# Patient Record
Sex: FEMALE | Race: WHITE
[De-identification: names, ages, dates, MRNs, and addresses within clinical notes are randomized per-mention and may not be internally consistent; named-entity substitution may affect disease eponyms.]

---

## 2017-01-14 ENCOUNTER — HOSPITAL ENCOUNTER (INPATIENT)
Dept: HOSPITAL 10 - OBT | Age: 27
LOS: 3 days | Discharge: HOME | End: 2017-01-17
Attending: OBSTETRICS & GYNECOLOGY | Admitting: OBSTETRICS & GYNECOLOGY
Payer: MEDICAID

## 2017-01-14 VITALS
BODY MASS INDEX: 29.07 KG/M2 | HEIGHT: 59 IN | WEIGHT: 144.18 LBS | WEIGHT: 144.18 LBS | BODY MASS INDEX: 29.07 KG/M2 | HEIGHT: 59 IN

## 2017-01-14 VITALS — DIASTOLIC BLOOD PRESSURE: 70 MMHG | HEART RATE: 61 BPM | RESPIRATION RATE: 18 BRPM | SYSTOLIC BLOOD PRESSURE: 127 MMHG

## 2017-01-14 DIAGNOSIS — Z3A.38: ICD-10-CM

## 2017-01-14 DIAGNOSIS — Z23: ICD-10-CM

## 2017-01-14 DIAGNOSIS — O34.211: ICD-10-CM

## 2017-01-14 LAB
ADD UMIC: YES
APTT BLD: 29.6 SEC (ref 25–35)
BACTERIA #/AREA URNS HPF: (no result) /[HPF]
BASOPHILS # BLD AUTO: 0 10^3/UL (ref 0–0.1)
BASOPHILS NFR BLD: 0.2 % (ref 0–2)
COLOR UR: (no result)
CONDITION: 1
EOSINOPHIL # BLD: 0.1 10^3/UL (ref 0–0.5)
EOSINOPHIL NFR BLD: 0.4 % (ref 0–7)
ERYTHROCYTE [DISTWIDTH] IN BLOOD BY AUTOMATED COUNT: 13.2 % (ref 11.5–14.5)
GLUCOSE UR STRIP-MCNC: NEGATIVE %
HCT VFR BLD CALC: 37.5 % (ref 37–47)
HGB BLD-MCNC: 12.9 G/DL (ref 12–16)
INR PPP: 0.88
KETONES UR STRIP.AUTO-MCNC: 15 MG/DL
LYMPHOCYTES # BLD AUTO: 3.1 10^3/UL (ref 0.8–2.9)
LYMPHOCYTES NFR BLD AUTO: 22.8 % (ref 15–51)
MCH RBC QN AUTO: 30.1 PG (ref 29–33)
MCHC RBC AUTO-ENTMCNC: 34.3 G/DL (ref 32–37)
MCV RBC AUTO: 87.8 FL (ref 82–101)
MONOCYTES # BLD: 0.6 10^3/UL (ref 0.3–0.9)
MONOCYTES NFR BLD: 4.4 % (ref 0–11)
NEUTROPHILS # BLD: 9.9 10^3/UL (ref 1.6–7.5)
NEUTROPHILS NFR BLD AUTO: 72.2 % (ref 39–77)
NITRITE UR QL STRIP.AUTO: NEGATIVE
NRBC # BLD MANUAL: 0 10^3/UL (ref 0–0)
NRBC BLD QL: 0 /100WBC (ref 0–0)
PLATELET # BLD: 235 10^3/UL (ref 140–440)
PMV BLD AUTO: 9 FL (ref 7.4–10.4)
PROTHROMBIN TIME: 11.9 SEC (ref 12.2–14.2)
PT RATIO: 0.9
RBC # BLD AUTO: 4.27 10^6/UL (ref 4.2–5.4)
RBC # UR AUTO: (no result) /UL
RBC #/AREA URNS HPF: (no result) /HPF
SQUAMOUS #/AREA URNS HPF: (no result) /[HPF]
URINE BILIRUBIN (DIP): NEGATIVE
URINE TOTAL PROTEIN (DIP): NEGATIVE
UROBILINOGEN UR STRIP-ACNC: (no result) (ref 0.1–1)
WBC # BLD AUTO: 13.7 10^3/UL (ref 4.8–10.8)
WBC # BLD: 13.7 10^3/UL (ref 4.8–10.8)
WBC # UR STRIP: (no result) /UL

## 2017-01-14 PROCEDURE — 86900 BLOOD TYPING SEROLOGIC ABO: CPT

## 2017-01-14 PROCEDURE — 62319: CPT

## 2017-01-14 PROCEDURE — 86850 RBC ANTIBODY SCREEN: CPT

## 2017-01-14 PROCEDURE — 81001 URINALYSIS AUTO W/SCOPE: CPT

## 2017-01-14 PROCEDURE — 86920 COMPATIBILITY TEST SPIN: CPT

## 2017-01-14 PROCEDURE — 86901 BLOOD TYPING SEROLOGIC RH(D): CPT

## 2017-01-14 PROCEDURE — 90686 IIV4 VACC NO PRSV 0.5 ML IM: CPT

## 2017-01-14 PROCEDURE — 90715 TDAP VACCINE 7 YRS/> IM: CPT

## 2017-01-14 PROCEDURE — 85610 PROTHROMBIN TIME: CPT

## 2017-01-14 PROCEDURE — 86592 SYPHILIS TEST NON-TREP QUAL: CPT

## 2017-01-14 PROCEDURE — 85730 THROMBOPLASTIN TIME PARTIAL: CPT

## 2017-01-14 PROCEDURE — 81003 URINALYSIS AUTO W/O SCOPE: CPT

## 2017-01-14 PROCEDURE — 36415 COLL VENOUS BLD VENIPUNCTURE: CPT

## 2017-01-14 PROCEDURE — G0463 HOSPITAL OUTPT CLINIC VISIT: HCPCS

## 2017-01-14 PROCEDURE — 84112 EVAL AMNIOTIC FLUID PROTEIN: CPT

## 2017-01-14 PROCEDURE — 85025 COMPLETE CBC W/AUTO DIFF WBC: CPT

## 2017-01-14 RX ADMIN — PYRIDOXINE HYDROCHLORIDE SCH MLS/HR: 100 INJECTION, SOLUTION INTRAMUSCULAR; INTRAVENOUS at 23:36

## 2017-01-14 NOTE — HP
Date/Time of Note


Date/Time of Note


DATE: 17 


TIME: 23:21





OB - History


Hx of Present Pregnancy


Free Text/Dictation


27yo  at 38+5 by 12wk U/S with hx of 1LTCS x1 presented to OB triage with c/

o progressively worsening contraction pain since 0700 and LOF since 1400. Pt 

reports normal FM and denies VB.





Prior C/S was performed in  2/2 fetal distress per pt after a 24hr 

induction with little cervical change. 





OB course has been uncomplicated.


Pt has hx of R breast lumpectomy in  with benign pathology. She has been 

followed q6 months at [facility] for current R breast mass, unchanged 

x4 years.


Estimated Due Date:  2017


:  2


Para:  1


Prenatal Care:  Good Care


Ultrasounds:  Normal mid trimester US


Obstetrical Complications:  None


Medical Complications:  Other (R breast mass)





Past Family/Social History


*


Past Medical, Surgical, Family and Obstetric Histories reviewed from prenatal 

chart.


Blood Type:  O+


Rubella:  immune


RPR/VDRL:  Negative


GBS Status:  Negative


HBsAG:  Negative





OB  Admission Exam


Vital Signs


Vital Signs





 Vital Signs








  Date Time  Temp Pulse Resp B/P Pulse Ox O2 Delivery O2 Flow Rate FiO2


 


17 22:24 98.5 61 18 127/70  Room Air  











Physical Exam


HEENT:  WNL


Heart:  Other (R Breast- 2x2cm mobile mass palpated at 1-2 o'clock position)


Lungs:  Clear


Abdomen:  WNL


Extremities:  Normal


Cervical Dilatation:  1cm


Effacement:  Other (90%)


Station:  -2


Membranes:  Ruptured


Amniotic Fluid:  Clear


Fetal Heart Rate:  120's (110s primarily)


Accelerations:  Accelerations Present


Decelerations:  No Decelerations


Varibility:  Moderate


Contractions on Admission:  < 5 Minutes Apart


Intensity:  Firm


Last 72 hours Lab Results


 CBC & BMP


17 22:54











OB  Assessment/Plan


Other Assessment:


1) Early Labor at early term


2) Hx of C/S x1


Other plan:


1) Labor:


Given pt has had a prior C/S, discussed with pt options for mode of delivery, 

including repeat  section versus TOLAC. The risks of TOLAC after a C/S 

include an approximately <1% risk of uterine rupture which may result in fetal 

distress and emergent  delivery, increasing the risk of maternal and 

 morbidity. Pt is also aware of the risk of failure and need for a 

repeat  section. The expected recovery times for both modes of 

deliveries were discussed. Pt was made aware that if she chooses TOLAC, she may 

change her mind and request to proceed with a C/S at any time, if she has not 

yet delivered. The risks of C/S included but are not limited to pain, infection

, damage to nearby organs, structures or injury to baby, bleeding possibly 

requiring transfusion of blood products and abnormal placentation in future 

pregnancies. She had an opportunity to ask questions and to discuss the options 

with her  prior to making a decision. Pt desires to proceed with TOLAC. 

Consent forms for TOLAC, Repeat C/S and transfusion of blood products are in 

the chart. CEFM and toco.





2) ID: 


Pt s/p ROM x9 hours. Afebrile. GBS negative. Antibiotics are not indicated.





3) Pain:


Pt desires epidural. Anesthesiologist will be consulted once admission labs are 

resulted.





4) FWB: 


Category I FHT. Reassuring





5) R Breast Mass:


Asymptomatic. Continue expectant management. Pt to f/up at [facility] 

in 2mths for routine visit.





Expectations for admission to L&D, for labor and postpartum course if she 

proceeds with TOLAC were explained, as were the same for repeat C/S. Pt and 

partner expressed understanding of discussion which was conducted in a language 

in which they understand. Questions were answered to their satisfaction.











FLAQUITA FARFAN MD 2017 23:36

## 2017-01-15 VITALS — RESPIRATION RATE: 18 BRPM | DIASTOLIC BLOOD PRESSURE: 55 MMHG | HEART RATE: 80 BPM | SYSTOLIC BLOOD PRESSURE: 105 MMHG

## 2017-01-15 VITALS — HEART RATE: 78 BPM | DIASTOLIC BLOOD PRESSURE: 61 MMHG | RESPIRATION RATE: 18 BRPM | SYSTOLIC BLOOD PRESSURE: 103 MMHG

## 2017-01-15 VITALS — SYSTOLIC BLOOD PRESSURE: 114 MMHG | DIASTOLIC BLOOD PRESSURE: 59 MMHG | RESPIRATION RATE: 16 BRPM | HEART RATE: 77 BPM

## 2017-01-15 VITALS — SYSTOLIC BLOOD PRESSURE: 104 MMHG | HEART RATE: 75 BPM | DIASTOLIC BLOOD PRESSURE: 53 MMHG | RESPIRATION RATE: 19 BRPM

## 2017-01-15 PROCEDURE — 0DQR0ZZ REPAIR ANAL SPHINCTER, OPEN APPROACH: ICD-10-PCS | Performed by: OBSTETRICS & GYNECOLOGY

## 2017-01-15 RX ADMIN — DOCUSATE SODIUM AND SENNOSIDES SCH TAB: 8.6; 5 TABLET, FILM COATED ORAL at 09:00

## 2017-01-15 RX ADMIN — ACETAMINOPHEN AND CODEINE PHOSPHATE PRN TAB: 30; 300 TABLET ORAL at 12:43

## 2017-01-15 RX ADMIN — ACETAMINOPHEN AND CODEINE PHOSPHATE PRN TAB: 30; 300 TABLET ORAL at 21:57

## 2017-01-15 RX ADMIN — PYRIDOXINE HYDROCHLORIDE SCH MLS/HR: 100 INJECTION, SOLUTION INTRAMUSCULAR; INTRAVENOUS at 00:28

## 2017-01-15 RX ADMIN — IBUPROFEN SCH MG: 600 TABLET ORAL at 17:38

## 2017-01-15 RX ADMIN — PYRIDOXINE HYDROCHLORIDE SCH MLS/HR: 100 INJECTION, SOLUTION INTRAMUSCULAR; INTRAVENOUS at 23:27

## 2017-01-15 RX ADMIN — IBUPROFEN SCH MG: 600 TABLET ORAL at 11:36

## 2017-01-15 RX ADMIN — PYRIDOXINE HYDROCHLORIDE SCH MLS/HR: 100 INJECTION, SOLUTION INTRAMUSCULAR; INTRAVENOUS at 10:55

## 2017-01-15 RX ADMIN — PYRIDOXINE HYDROCHLORIDE SCH MLS/HR: 100 INJECTION, SOLUTION INTRAMUSCULAR; INTRAVENOUS at 07:27

## 2017-01-15 RX ADMIN — DOCUSATE SODIUM AND SENNOSIDES SCH TAB: 8.6; 5 TABLET, FILM COATED ORAL at 21:09

## 2017-01-15 NOTE — LDN
Date/Time of Note


Date/Time of Note


DATE: 1/15/17 


TIME: 07:27





Delivery Summary


Pt progressed to c/c/-1 and labored down to 0 station. She then pushed with 

good effort to +2, however deep variables were noted to the 60s with pushing. 

The FHT returned to baseline following pushing, however at times had slow 

return. Variability largely moderate. Patient then began  pushing with 

alternating contractions, however decels remained. Using a  

Elvi,  #[id number], the R/B/A of VAVD (as a double setup in the OR) 

versus RLTCS were discussed in detail including risks of vacuum which include 

but are not limited to cephalohematoma, subgaleal hemorrhage, retinal hemorrhage

, shoulder dystocia, scalp lacerations and failure. The patient initially 

desired a RLTCS after being counseled on risks of operative delivery and pt was 

taken to the OR. En route, however, pt changed her mind and expressed a desire 

for a trial of vacuum delivery.





Pt pushed under epidural anesthesia in the OR under a double setup to a  

via vaccuum assist of a liveborn male infant weighing 2950g with Apgars of 9 

and 9 at 1 and 5 min respectively. Over three contractions with no pull-offs, 

the head delivered from JORGE position and the vacuum was removed. The R anterior 

shoulder followed by the posterior shoulder and the remainder of the body was 

delivered through a nuchal cord x1. The infant was vigorous at birth and placed 

on mother's abdomen. The pt received standard IV Pitocin. The cord was doubly 

clamped and cut and the baby was taken to the warmer for evaluation by RT and 

NICU nurses given the operative delivery. Cord blood was collected. An intact 

3VC placenta spontaneously delivered shortly thereafter. The fundus was noted 

to be firm. Inspection of the vagina and perineum revealed a 3rd degree (3b) 

perineal lacerations and a hemostatic L labial laceration which was not 

repaired. The 3rd degree laceration was repaired with a combination of regional 

and local anesthesia using 0 Vicryl suture. Using end-to-end method, 

interrupted sutures were placed to bring the ends together. The second degree 

was repaired in the usual fashion using 3-0 Vicryl. The patient received Ancef 

2g IV x1. Hemostasis was appropriate following repair. Several rectal 

examinations were done which confirmed no defect or palpable suture. Infant and 

mother recovering in PACU. EBL 400ml


Assisted Vaginal Delivery:  Vacuum


Placenta Delivered:  Spontaneously


Meconium:  none


Perineum intact?:  No


Perineal laceration:  3


Anesthesia type:  Epidural


Estimated blood loss:  400


Sponge & Needle done & correct:  Yes


All needle counts correct:  Yes


Any foreign bodies felt in the:  No


Problems:  





Infant Delivery Information


Sex


Infant Sex:  male





Apgars


1 Minute:  9


5 Minute:  9





Suctioning


Nose & mouth suctioned at christian:  No


Delee suction performed:  No





Umbilical Cord


Umbilical cord with:  3 Vessels


Cord presentations:  nuchal cord


Nuchal cord present X:  1





Mother & Baby Disposition


Disposition


Mom & Baby to Maternity; Good:  Yes


Baby to NICU:  No











FLAQUITA FARFAN MD Doron 15, 2017 07:27

## 2017-01-15 NOTE — OPRPT
===================================

Intraop Record

===================================

Datetime Report Generated by CPN: 01/15/2017 08:54

   

   

===========================

Datetime: 01/14/2017 21:48

===========================

   

Food Allergies/Reactions:  NO

Latex Allergies/Reactions:  No Latex Allergies

   

===========================

Datetime: 01/14/2017 21:38

===========================

   

Drug Allergies/Reactions:  No Known Allergy (01/14/2017)

   

===========================

Datetime: 01/14/2017 21:32

===========================

   

Drug Allergies/Reactions:  Unknown: Unable to obtain/MI (08/12/2010)

## 2017-01-15 NOTE — TRIAGE
===================================

OB Triage

===================================

Datetime Report Generated by CPN: 01/15/2017 00:03

   

   

===========================

Datetime: 01/14/2017 23:27

===========================

   

 Stage of Pregnancy:  Labor

   

===========================

Datetime: 01/14/2017 23:15

===========================

   

 Stage of Pregnancy:  OB Triage

   

===================================

Labor Evaluation

===================================

   

 Frequency:  2-5

 Monitor Mode:  External

 Duration (sec)2399:  

 Quality:  Moderate

 Pattern:  Normal: <= 5 Contractions in 10 Minutes

 Resting Tone [place]:  Relaxed

   

===================================

Fetal Heart Rate

===================================

   

 FHR Baseline Rate:  120

 Monitor Mode:  External US

 Variability:  Moderate 6-25 bpm

 Accelerations:  15X15

 Decelerations:  None

 Category:  Category I

   

===================================

Pain Assessment

===================================

   

 Pain Scale:  8

 Pain Presence:  Intermittent

 Pain Type:  Cramping; Contraction; Ache

 Pain Location:  Abdomen; Back

 Pain Goal:  5

 Pain Relief Measures:  Comfort Measures

 Pain Assessment Comments:  WANTS EPIDURAL

   

===========================

Datetime: 01/14/2017 22:15

===========================

   

 Stage of Pregnancy:  OB Triage

 Temperature Route:  Oral

   

===================================

Labor Evaluation

===================================

   

 Frequency:  2-3.5

 Monitor Mode:  External

 Duration (sec)2399:  

 Quality:  Moderate

 Pattern:  Normal: <= 5 Contractions in 10 Minutes

 Resting Tone [place]:  Relaxed

   

===================================

Fetal Heart Rate

===================================

   

 FHR Baseline Rate:  125

 Monitor Mode:  External US

 Variability:  Moderate 6-25 bpm

 Accelerations:  15X15

 Decelerations:  None

 Category:  Category I

   

===================================

Pain Assessment

===================================

   

 Pain Scale:  8 (Annotations: WHEN CXS COMES)

 Pain Presence:  Intermittent

 Pain Type:  Cramping; Contraction; Ache

 Pain Location:  Abdomen; Back

 Pain Goal:  5

 Pain Relief Measures:  Comfort Measures

   

===========================

Datetime: 01/14/2017 22:06

===========================

   

   

===================================

Vaginal Exam

===================================

   

 Dilatation (cms):  1.0

 Effacement (%):  90

 Station:  -2

 Exam By:  NABOR

 Vaginal Bleeding:  None

 Cervix, Consistency:  Soft

 Cervix, Position:  Anterior

   

===========================

Datetime: 01/14/2017 21:51

===========================

   

   

===================================

Maternal Assessment

===================================

   

 Level of Consciousness:  Fully Conscious

 DTR's/Clonus:  DTRs 2+; No Clonus

 Headache:  Denies

 Blurred Vision:  No

 Respiratory Effort:  Unlabored; Regular Rhythm; Equal Expansion

 Breath Sounds, Left:  Clear and Equal

 Breath Sounds, Right:  Clear and Equal

 Nausea/Vomiting:  Denies

 RUQ Epigastric Pain:  Denies

 Lower Extremities Edema:  None

 Upper Extremities Edema:  None

 Facial Edema:  None

   

===================================

Fall Risk Assessment

===================================

   

 History of Falling:  (0) No

 Secondary Diagnosis:  (0) No

 Ambulatory Aid:  (0) Bedrest/Nurse Assist

 IV Therapy:  (0) No

 Gait:  (0) Normal/Bedrest/Immobile

 Mental Status:  (0) Oriented to Own Ability

 Fall Score:  0

 Fall Risk Score Definition:  No Risk: No action required

   

===========================

Datetime: 01/14/2017 21:49

===========================

   

 Time of Arrival:  01/14/2017 21:35

 EGA:  38.5

 Arrived By:  Wheelchair

 Arrived From:  Home

 Chief Complaint:  CXS SINCE 0700 AND LEAKING SINCE 1400

 Fetal Movement:  Present

 Contractions:  Regular

 Time Contractions Began:  01/14/2017 07:00

 Contractions:  Q10

 Rupture of Membranes:  Unsure

 Vaginal Bleeding:  None

 Vaginal Discharge:  Denies

 Recent Sexual Intercouse:  Denies

 Abdominal Trauma:  Not Applicable

 Patient Complaints:  Contractions

 Time Provider Notified:  01/14/2017 21:57

 Provider Notified:  NAHEED

 Initial Plan:  EFM, ASSESSMENT, CALL MD FOR ORDERS

   

===========================

Datetime: 01/14/2017 21:48

===========================

   

 Membranes Ruptured Date/Time:  01/14/2017 14:00

 Membranes Rupture Method:  Spontaneous

## 2017-01-15 NOTE — DS
Date/Time of Note


Date/Time of Note


DATE: 1/15/17 


TIME: 12:17





Obstetrical Discharge Record


Final Diagnosis


Final Diagnosis:  Term delivered





Vaginal Delivery


Obstetrical Delivery:  Spontaneous





Condition on Discharge


Physical Assessment


Last Vitals:


Vital sign  stable abdomen soft uterus firm lochia normal extremity normal 

discharged home with follow-up instruction to make appointment in 2 weeks post 

delivery check


 Laboratory Tests








Test


  1/14/17


21:45 1/14/17


22:00 1/14/17


22:54


 


Urine Bacteria FEW   


 


Urine Bilirubin NEGATIVE   


 


Urine Clarity CLEAR   


 


Urine Color LT. YELLOW   


 


Urine Glucose NEGATIVE%   


 


Urine Hemoglobin 2+   


 


Urine Ketones 15   


 


Urine Leukocyte Esterase TRACE   


 


Urine Microscopic RBC 2-5/HPF   


 


Urine Microscopic WBC 5-10/HPF   


 


Urine Nitrite NEGATIVE   


 


Urine Specific Gravity 1.010   


 


Urine Squamous Epithelial


Cells MANY 


  


  


 


 


Urine Total Protein NEGATIVE   


 


Urine Urobilinogen 0.2  E.U./dL   


 


Urine pH 6.5   


 


Fetal Membranes Rupture  POSITIVE  


 


Activated Partial


Thromboplast Time 


  


  29.6Sec 


 


 


Basophils #   0.010^3/ul 


 


Basophils %   0.2% 


 


Eosinophils #   0.110^3/ul 


 


Eosinophils %   0.4% 


 


Hematocrit   37.5% 


 


Hemoglobin   12.9g/dl 


 


INR International Normalized


Ratio 


  


  0.88 


 


 


Lymphocytes #   3.110^3/ul 


 


Lymphocytes %   22.8% 


 


Mean Corpuscular Hemoglobin   30.1pg 


 


Mean Corpuscular Hemoglobin


Concent 


  


  34.3g/dl 


 


 


Mean Corpuscular Volume   87.8fl 


 


Mean Platelet Volume   9.0fl 


 


Monocytes #   0.610^3/ul 


 


Monocytes %   4.4% 


 


Neutrophils #   9.910^3/ul 


 


Neutrophils %   72.2% 


 


Nucleated Red Blood Cells #   0.010^3/ul 


 


Nucleated Red Blood Cells %   0.0/100WBC 


 


Platelet Count   41180^3/UL 


 


Prothrombin Time   11.9Sec 


 


Prothrombin Time Ratio   0.9 


 


Red Blood Count   4.2710^6/ul 


 


Red Cell Distribution Width   13.2% 


 


White Blood Count   13.710^3/ul 








 Current Medications








 Medications


  (Trade)  Dose


 Ordered  Sig/Desiree


 Route


 PRN Reason  Start Time


 Stop Time Status Last Admin


Dose Admin


 


 Terbutaline


 Sulfate 0.25 mg  0.25 mg  ONCE  ONCE


 SC


   1/14/17 22:30


 1/14/17 22:31 DC  


 


 


 Terbutaline


 Sulfate  0 ml @   STK-MED ONCE


 .ROUTE


   1/14/17 22:12


 1/14/17 22:13 DC  


 


 


 Lactated Ringer's  1,000 ml @ 


 125 mls/hr  Q8H


 IV


   1/14/17 22:46


 1/15/17 07:30 DC 1/15/17 00:28


 


 


 Oxytocin/Lactated


 Ringer's  500 ml @ 0


 mls/hr  ONCE PRN


 IV


 For Hemorrhage Management  1/14/17 23:00


 1/15/17 07:30 DC  


 


 


 Methylergonovine


 Maleate


  (Methergine)  0.2 mg  ONCE PRN


 IM


 VAGINAL BLEEDING  1/14/17 23:00


 1/15/17 07:30 DC  


 


 


 Carboprost


 Tromethamine


  (Hemabate)  250 mcg  ONCE PRN


 IM


 VAGINAL BLEEDING  1/14/17 23:00


 1/15/17 07:30 DC  


 


 


 Misoprostol


  (Cytotec)  1,000 mcg  ONCE PRN


 GA


 VAGINAL BLEEDING  1/14/17 23:00


 1/15/17 07:30 DC  


 


 


 Butorphanol


 Tartrate


  (Stadol)  2 mg  Q2H  PRN


 IV


 PAIN  1/14/17 23:00


 1/15/17 07:30 DC  


 


 


 Lidocaine 30 ml  30 ml  ONCE PRN


 INJ


 EPISIOTOMY/TEARING  1/14/17 23:00


 1/15/17 07:30 DC  


 


 


 Oxytocin/Lactated


 Ringer's  500 ml @ 


 125 mls/hr  ONCE -MAY REPEAT X1


 IV


   1/14/17 23:00


 1/15/17 07:30 DC 1/15/17 07:08


 


 


 Oxytocin/Lactated


 Ringer's  500 ml @ 


 125 mls/hr  ONCE


 IV


   1/14/17 23:00


 1/15/17 07:30 DC  


 


 


 Ibuprofen


  (Motrin)  600 mg  ONCE PRN


 PO


 Mild Pain (Pain Score 1-3)  1/14/17 23:00


 1/15/17 07:30 DC  


 


 


 Acetaminophen/


 Codeine Phosphate


 2 tab  2 tab  ONCE PRN


 PO


 Moderate to Severe Pain (4-10)  1/14/17 23:00


 1/15/17 07:30 DC  


 


 


 Lactated Ringer's  1,000 ml @ 


 2,000 mls/hr  Q30M PRN


 IV


 PRE-EPIDURAL BOLUS  1/14/17 23:00


 1/15/17 07:30 DC 1/14/17 23:28


 


 


 Fentanyl/


 Ropivacaine  100 ml @   STK-MED ONCE


 .ROUTE


   1/14/17 23:52


 1/14/17 23:53 DC  


 


 


 Acetaminophen


  (Tylenol Tab)  1,000 mg  ONCE  STAT


 PO


   1/15/17 00:42


 1/15/17 00:45 DC 1/15/17 01:31


 


 


 Naloxone HCl


  (Narcan)  0.2 mg  Q2M  PRN


 IV


 FOR RESP RATE 8 OR LESS  1/15/17 04:30


     


 


 


 Fentanyl/


 Ropivacaine  100 ml  EPIDURAL (PCEA)


 EPI


   1/15/17 04:30


     


 


 


 Terbutaline


 Sulfate


  (Brethine)  0.25 mg  NOW  STAT


 SC


   1/15/17 04:52


 1/15/17 05:00 DC  


 


 


 Cefazolin Sodium


  (Ancef)  1 gm  STK-MED ONCE


 .ROUTE


   1/15/17 06:17


 1/15/17 06:18 DC  


 


 


 Lidocaine/


 Epinephrine


  (Xylocaine 2%/


 Epi)  30 ml  STK-MED ONCE


 .ROUTE


   1/15/17 06:19


 1/15/17 06:20 DC  


 


 


 Fentanyl


  (Sublimaze)  100 mcg  STK-MED ONCE


 .ROUTE


   1/15/17 06:24


 1/15/17 06:25 DC  


 


 


 Hydromorphone HCl


  (Dilaudid (Rec))  0.2 mg  PACU ORDER PRN


 IV


 MILD PAIN LEVEL 1-3  1/15/17 06:30


 1/15/17 07:30 DC  


 


 


 Hydromorphone HCl


  (Dilaudid (Rec))  0.4 mg  PACU ORDER PRN


 IV


 MODERATE PAIN LEVEL 4-6  1/15/17 06:30


 1/15/17 07:30 DC  


 


 


 Hydromorphone HCl


  (Dilaudid (Rec))  0.6 mg  PACU ORDER PRN


 IV


 SEVERE PAIN LEVEL 7-10  1/15/17 06:30


 1/15/17 07:30 DC  


 


 


 Oxycodone/


 Acetaminophen


  (Percocet (5/


 325))  1 tab  PACU ORDER PRN


 PO


 PAIN LEVEL 1-5  1/15/17 06:30


 1/15/17 07:30 DC  


 


 


 Oxycodone/


 Acetaminophen


  (Percocet (5/


 325))  2 tab  PACU ORDER PRN


 PO


 PAIN LEVEL 6-10  1/15/17 06:30


 1/15/17 07:30 DC  


 


 


 Ondansetron HCl


  (Zofran Inj)  4 mg  PACU ORDER PRN


 IV


 NAUSEA AND/OR VOMITING  1/15/17 06:30


 1/15/17 07:30 DC  


 


 


 Metoclopramide HCl


  (Reglan)  10 mg  PACU ORDER PRN


 IV


 NAUSEA AND/OR VOMITING  1/15/17 06:30


 1/15/17 07:30 DC  


 


 


 Meperidine HCl


  (Demerol)  25 mg  PACU ORDER PRN


 IV


 POST-OP RIGORS  1/15/17 06:30


 1/15/17 07:30 DC  


 


 


 Diphenhydramine


 HCl 25 mg  25 mg  PACU ORDER PRN


 IV


 PRURITUS  1/15/17 06:30


 1/15/17 07:30 DC  


 


 


 Lactated Ringer's


  (Lr)  1,000 ml @ 


 125 mls/hr  Q8H


 IV*


   1/15/17 07:27


    1/15/17 10:55


 


 


 Ibuprofen


  (Motrin)  600 mg  Q6


 PO


   1/15/17 12:00


    1/15/17 11:36


 


 


 Acetaminophen/


 Codeine Phosphate


  (Tylenol No.3)  1 tab  Q4H  PRN


 PO


 PAIN LEVEL 1-5  1/15/17 07:30


     


 


 


 Acetaminophen/


 Codeine Phosphate


  (Tylenol No.3)  2 tab  Q4H  PRN


 PO


 PAIN LEVEL 6-10  1/15/17 07:30


     


 


 


 Ondansetron HCl


  (Zofran Inj)  4 mg  Q6H  PRN


 IV


 NAUSEA AND/OR VOMITING  1/15/17 07:30


     


 


 


 Diphenhydramine


 HCl


  (Benadryl)  25 mg  Q6H  PRN


 IV


 PRURITUS  1/15/17 07:30


     


 


 


 Senna/Docusate


 Sodium


  (Senokot-S)  1 tab  BID


 PO


   1/15/17 09:00


     


 


 


 Benzocaine


  (Dermoplast


 Spray)  1 spray  BEDSIDE MEDICATION  PRN


 TOP


 HEMORRHOID/EPISIOTMY PAIN  1/15/17 07:30


    1/15/17 10:03


 


 


 Dibucaine


  (Nupercainal)  1 applic  BEDSIDE MEDICATION  PRN


 GA


 HEMORRHOID/EPISIOTMY PAIN  1/15/17 07:30


     


 


 


 Lanolin


  (Bharath-O-Soothe)  1 applic  BEDSIDE MEDICATION  PRN


 TOP


 BEDSIDE FOR KANU TO NIPPLES  1/15/17 07:30


    1/15/17 10:03


 


 


 Diphtheria/


 Tetanus/Acell


 Pertussis


  (Adacel)  0.5 ml  ONCE ONCE


 IM*


   1/17/17 09:00


 1/17/17 09:01   


 


 


 Acetaminophen 650


 mg  650 mg  Q4H  PRN


 PO


 ELEVATED TEMPERATURE  1/15/17 07:30


    1/15/17 08:56


 


 


 Oxytocin/Lactated


 Ringer's  500 ml @ 0


 mls/hr  ONCE PRN


 IV


 For Hemorrhage Management  1/15/17 07:30


     


 


 


 Methylergonovine


 Maleate


  (Methergine)  0.2 mg  ONCE PRN


 IM


 VAGINAL BLEEDING  1/15/17 07:30


     


 


 


 Carboprost


 Tromethamine


  (Hemabate)  250 mcg  ONCE PRN


 IM


 VAGINAL BLEEDING  1/15/17 07:30


     


 


 


 Misoprostol


  (Cytotec)  1,000 mcg  ONCE PRN


 GA


 VAGINAL BLEEDING  1/15/17 07:30


     


 








Voiding:  Yes


Bowel Movement:  Yes


Breast:  Soft, non-tender, Filling


Fundus:  Firm


Calf Tenderness:  No


Patient Condition:  Good











EDWIN GAVIRIA MD Doron 15, 2017 12:18

## 2017-01-15 NOTE — DELSUM
===================================

Delivery Summary A-C

===================================

Datetime Report Generated by CPN: 01/15/2017 08:54

   

   

===================================

DELIVERY PERSONNEL

===================================

   

Circulator:  Miranda Harriett

   

===================================

MATERNAL INFORMATION

===================================

   

Delivery Anesthesia:  Epidural

Medications in Delivery:  PITOCIN

Estimated  Blood Loss (ml):  400

Placenta Cultured:  No

Maternal Complications:  Other

Other Maternal Complications:  C/SX1 IN 

RN Comments:   PER PT REQUEST, DR FARFAN INFORMED RISK OF . 

   

===================================

LABOR SUMMARY

===================================

   

EDC:  2017 00:00

No. Babies in Womb:  1

 Attempted:  Yes

Labor Anesthesia:  Epidural

   

===================================

LABOR INFORMATION

===================================

   

Reason for Induction:  Not Applicable

Onset of Labor:  2017 14:00

Complete Dilatation:  01/15/2017 01:51

Oxytocin:  N/A

Group B Beta Strep:  Negative

Antibiotics # of Doses:  1

Antibiotics Time of Last Dose:  0619 01/15/17

Steroids Given:  None

Reason Steroids Not Administered:  Not Applicable

   

===================================

MEMBRANES

===================================

   

Membranes Rupture Method:  Spontaneous

Membranes Rupture Method:  Spontaneous

Rupture of Membranes:  2017 14:00

Length of Rupture (hr):  16.25

Amniotic Fluid Color:  Clear

Amniotic Fluid Amount:  Small

Amniotic Fluid Odor:  Normal

   

===================================

STAGES OF LABOR

===================================

   

Stage 1 hr:  11

Stage 1 min:  51

Stage 2 hr:  4

Stage 2 min:  24

Stage 3 hr:  0

Stage 3 min:  2

Total Time in Labor hr:  16

Total Time in Labor min:  17

   

===================================

VAGINAL DELIVERY

===================================

   

Episiotomy:  None

Laceration Extension:  Third Degree

Laceration Type:  Perineal

Laceration Repair:  Yes

Initial Vag Sponge Count:  20

Final Vag Sponge Count:  20

Initial Vag Sharps Count:  1

Final Vag Sharps Count:  3

Sponge Count Correct:  Yes

Sharps Count Correct:  Yes

   

===================================

CSECTION DELIVERY

===================================

   

Primary Indication:  N/A

   

===================================

BABY A INFORMATION

===================================

   

Infant Delivery Date/Time:  01/15/2017 06:15

Method of Delivery:  Vaginal

Born in Route :  No

:  Successful

Forceps:  N/A

Vacuum Extraction:  Successful

Shoulder Dystocia :  N/A

   

===================================

ASSISTED DELIVERY BABY A

===================================

   

Indication for Assisted Delivery:  FETAL MULTIPLE DEVEL

Catheter Prior to Procedure:  Yes

Station Vacuum/Forcep Apply:  +3

Position Vacuum/Forcep Apply:  Left Occipital Posterior

Vacuum Number of Pulls:  1

Vacuum Number of PopOffs:  N/A

   

===================================

SHOULDER DYSTOCIA BABY A

===================================

   

Infant Delivery Date/Time:  01/15/2017 06:15

   

===================================

PRESENTATION/POSITION BABY A

===================================

   

Presentation:  Cephalic

Cephalic Presentation:  Vertex

Vertex Position:  Left Occipital Anterior

Breech Presentation:  N/A

   

===================================

PLACENTA INFORMATION BABY A

===================================

   

Placenta Delivery Time :  01/15/2017 06:17

Placenta Method of Delivery:  Spontaneous

Placenta Status:  Delivered

   

===================================

APGAR SCORES BABY A

===================================

   

Heart Rate 1 min:  >100 bpm

Resp Effort 1 min:  Good Cry

Reflex Irritability 1 min:  Cough/Sneeze/Pulls Away

Muscle Tone 1 min:  Active Motion

Color 1 min:  Body Pink, Extremit Blue

APGAR SCORE 1 MIN:  9

Heart Rate 5 min:  >100 bpm

Resp Effort 5 min:  Good Cry

Reflex Irritability 5 min:  Cough/Sneeze/Pulls Away

Muscle Tone 5 min:  Active Motion

Color 5 min:  Body Pink, Extremit Blue

APGAR SCORE 5 MIN:  9

   

===================================

INFANT INFORMATION BABY A

===================================

   

Gestational Age at Delivery:  38.6

Gestational Status:  Early Term- 37- 38.6 Weeks

Infant Outcome :  Liveborn

Infant Condition :  Stable

Infant Sex:  Male

   

===================================

IDENTIFICATION/MEDS BABY A

===================================

   

ID Band Number:  [id number]

ID Band Location:  Right Leg; Left Arm



Sensor Applied:  Yes

Sensor Number:  [id number]

Sensor Location :  Cord Clamp

Vitamin K Given :  Not Given

Erythromycin Given:  Not Given

   

===================================

WEIGHT/LENGTH BABY A

===================================

   

Infant Birthweight (gm):  2950

Infant Weight (lb):  6

Infant Weight (oz):  8

Infant Length (in):  18.50

Infant Length (cm):  46.99

   

===================================

CORD INFORMATION BABY A

===================================

   

No. Cord Vessels:  3

Nuchal Cord :  Around Neck x1, Tight

Cord Blood Taken:  Yes

Infant Suction:  Mouth; Nose

   

===================================

ASSESSMENT BABY A

===================================

   

Infant Complications:  Decreased Variability

Physical Findings at Delivery:  Within Normal Limits

Infant Respirations:  Appears Normal

Neonatologist/ALS Called :  No

Infant Care By:  NABOR

## 2017-01-16 VITALS — SYSTOLIC BLOOD PRESSURE: 111 MMHG | DIASTOLIC BLOOD PRESSURE: 53 MMHG | HEART RATE: 71 BPM | RESPIRATION RATE: 18 BRPM

## 2017-01-16 VITALS — HEART RATE: 70 BPM | DIASTOLIC BLOOD PRESSURE: 64 MMHG | RESPIRATION RATE: 16 BRPM | SYSTOLIC BLOOD PRESSURE: 120 MMHG

## 2017-01-16 VITALS — RESPIRATION RATE: 72 BRPM | SYSTOLIC BLOOD PRESSURE: 100 MMHG | HEART RATE: 65 BPM | DIASTOLIC BLOOD PRESSURE: 62 MMHG

## 2017-01-16 VITALS — RESPIRATION RATE: 18 BRPM | DIASTOLIC BLOOD PRESSURE: 52 MMHG | SYSTOLIC BLOOD PRESSURE: 103 MMHG | HEART RATE: 65 BPM

## 2017-01-16 LAB
BASOPHILS # BLD AUTO: 0 10^3/UL (ref 0–0.1)
BASOPHILS NFR BLD: 0.3 % (ref 0–2)
CONDITION: 1
EOSINOPHIL # BLD: 0.2 10^3/UL (ref 0–0.5)
EOSINOPHIL NFR BLD: 1 % (ref 0–7)
ERYTHROCYTE [DISTWIDTH] IN BLOOD BY AUTOMATED COUNT: 13.8 % (ref 11.5–14.5)
HCT VFR BLD CALC: 30.5 % (ref 37–47)
HGB BLD-MCNC: 10.4 G/DL (ref 12–16)
LYMPHOCYTES # BLD AUTO: 3.8 10^3/UL (ref 0.8–2.9)
LYMPHOCYTES NFR BLD AUTO: 24.4 % (ref 15–51)
MCH RBC QN AUTO: 30.6 PG (ref 29–33)
MCHC RBC AUTO-ENTMCNC: 34.1 G/DL (ref 32–37)
MCV RBC AUTO: 89.5 FL (ref 82–101)
MONOCYTES # BLD: 0.6 10^3/UL (ref 0.3–0.9)
MONOCYTES NFR BLD: 4 % (ref 0–11)
NEUTROPHILS # BLD: 10.9 10^3/UL (ref 1.6–7.5)
NEUTROPHILS NFR BLD AUTO: 70.3 % (ref 39–77)
NRBC # BLD MANUAL: 0 10^3/UL (ref 0–0)
NRBC BLD QL: 0 /100WBC (ref 0–0)
PLATELET # BLD: 231 10^3/UL (ref 140–440)
PMV BLD AUTO: 9.6 FL (ref 7.4–10.4)
RBC # BLD AUTO: 3.4 10^6/UL (ref 4.2–5.4)
WBC # BLD AUTO: 15.5 10^3/UL (ref 4.8–10.8)
WBC # BLD: 15.5 10^3/UL (ref 4.8–10.8)

## 2017-01-16 PROCEDURE — 3E00X4Z INTRODUCTION OF SERUM, TOXOID AND VACCINE INTO SKIN AND MUCOUS MEMBRANES, EXTERNAL APPROACH: ICD-10-PCS | Performed by: OBSTETRICS & GYNECOLOGY

## 2017-01-16 RX ADMIN — IBUPROFEN SCH MG: 600 TABLET ORAL at 12:43

## 2017-01-16 RX ADMIN — IBUPROFEN SCH MG: 600 TABLET ORAL at 00:09

## 2017-01-16 RX ADMIN — DOCUSATE SODIUM AND SENNOSIDES SCH TAB: 8.6; 5 TABLET, FILM COATED ORAL at 09:00

## 2017-01-16 RX ADMIN — PYRIDOXINE HYDROCHLORIDE SCH MLS/HR: 100 INJECTION, SOLUTION INTRAMUSCULAR; INTRAVENOUS at 23:27

## 2017-01-16 RX ADMIN — PYRIDOXINE HYDROCHLORIDE SCH MLS/HR: 100 INJECTION, SOLUTION INTRAMUSCULAR; INTRAVENOUS at 15:27

## 2017-01-16 RX ADMIN — IBUPROFEN SCH MG: 600 TABLET ORAL at 17:56

## 2017-01-16 RX ADMIN — DOCUSATE SODIUM AND SENNOSIDES SCH TAB: 8.6; 5 TABLET, FILM COATED ORAL at 20:54

## 2017-01-16 RX ADMIN — PYRIDOXINE HYDROCHLORIDE SCH MLS/HR: 100 INJECTION, SOLUTION INTRAMUSCULAR; INTRAVENOUS at 07:27

## 2017-01-16 RX ADMIN — IBUPROFEN SCH MG: 600 TABLET ORAL at 23:52

## 2017-01-16 RX ADMIN — IBUPROFEN SCH MG: 600 TABLET ORAL at 05:54

## 2017-01-16 NOTE — PN
Date/Time of Note


Date/Time of Note


DATE: 1/16/17 


TIME: 16:57





OB Subjective


Subjective


Subjective


Postpartum day 1


Vital sign a stable afebrile abdomen soft uterus firm lochia normal extremity 

normal











EDWIN GAVIRIA MD Jan 16, 2017 17:00

## 2017-01-17 VITALS — RESPIRATION RATE: 18 BRPM | SYSTOLIC BLOOD PRESSURE: 99 MMHG | DIASTOLIC BLOOD PRESSURE: 53 MMHG | HEART RATE: 63 BPM

## 2017-01-17 VITALS — SYSTOLIC BLOOD PRESSURE: 103 MMHG | HEART RATE: 59 BPM | RESPIRATION RATE: 19 BRPM | DIASTOLIC BLOOD PRESSURE: 51 MMHG

## 2017-01-17 RX ADMIN — DOCUSATE SODIUM AND SENNOSIDES SCH TAB: 8.6; 5 TABLET, FILM COATED ORAL at 09:13

## 2017-01-17 RX ADMIN — IBUPROFEN SCH MG: 600 TABLET ORAL at 05:35

## 2017-01-17 RX ADMIN — IBUPROFEN SCH MG: 600 TABLET ORAL at 11:53

## 2019-07-11 ENCOUNTER — HOSPITAL ENCOUNTER (INPATIENT)
Dept: HOSPITAL 91 - OBT | Age: 29
LOS: 3 days | Discharge: HOME | End: 2019-07-14
Payer: MEDICAID

## 2019-07-11 ENCOUNTER — HOSPITAL ENCOUNTER (INPATIENT)
Dept: HOSPITAL 10 - OBT | Age: 29
LOS: 3 days | Discharge: HOME | End: 2019-07-14
Attending: OBSTETRICS & GYNECOLOGY | Admitting: OBSTETRICS & GYNECOLOGY
Payer: MEDICAID

## 2019-07-11 VITALS — RESPIRATION RATE: 18 BRPM | SYSTOLIC BLOOD PRESSURE: 133 MMHG | HEART RATE: 78 BPM | DIASTOLIC BLOOD PRESSURE: 66 MMHG

## 2019-07-11 VITALS — HEART RATE: 79 BPM | SYSTOLIC BLOOD PRESSURE: 139 MMHG | DIASTOLIC BLOOD PRESSURE: 68 MMHG | RESPIRATION RATE: 20 BRPM

## 2019-07-11 VITALS — HEART RATE: 75 BPM | SYSTOLIC BLOOD PRESSURE: 125 MMHG | RESPIRATION RATE: 19 BRPM | DIASTOLIC BLOOD PRESSURE: 71 MMHG

## 2019-07-11 VITALS
WEIGHT: 156.97 LBS | BODY MASS INDEX: 28.89 KG/M2 | HEIGHT: 62 IN | BODY MASS INDEX: 28.89 KG/M2 | WEIGHT: 156.97 LBS | HEIGHT: 62 IN

## 2019-07-11 VITALS — RESPIRATION RATE: 18 BRPM | DIASTOLIC BLOOD PRESSURE: 65 MMHG | SYSTOLIC BLOOD PRESSURE: 117 MMHG | HEART RATE: 85 BPM

## 2019-07-11 DIAGNOSIS — O34.211: Primary | ICD-10-CM

## 2019-07-11 DIAGNOSIS — Z30.2: ICD-10-CM

## 2019-07-11 DIAGNOSIS — Z3A.37: ICD-10-CM

## 2019-07-11 LAB
ADD MAN DIFF?: NO
BASOPHIL #: 0.1 10^3/UL (ref 0–0.1)
BASOPHILS %: 0.5 % (ref 0–2)
EOSINOPHILS #: 0.1 10^3/UL (ref 0–0.5)
EOSINOPHILS %: 1.4 % (ref 0–7)
HEMATOCRIT: 28.9 % (ref 37–47)
HEMOGLOBIN: 9.1 G/DL (ref 12–16)
HEPATITIS B SURFACE ANTIGEN: NEGATIVE
IMMATURE GRANS #M: 0.05 10^3/UL (ref 0–0.03)
IMMATURE GRANS % (M): 0.5 % (ref 0–0.43)
INR: 0.92
LYMPHOCYTES #: 2.5 10^3/UL (ref 0.8–2.9)
LYMPHOCYTES %: 26.5 % (ref 15–51)
MEAN CORPUSCULAR HEMOGLOBIN: 23.9 PG (ref 29–33)
MEAN CORPUSCULAR HGB CONC: 31.5 G/DL (ref 32–37)
MEAN CORPUSCULAR VOLUME: 76.1 FL (ref 82–101)
MEAN PLATELET VOLUME: 10.4 FL (ref 7.4–10.4)
MONOCYTE #: 0.6 10^3/UL (ref 0.3–0.9)
MONOCYTES %: 6.4 % (ref 0–11)
NEUTROPHIL #: 6.1 10^3/UL (ref 1.6–7.5)
NEUTROPHILS %: 64.7 % (ref 39–77)
NUCLEATED RED BLOOD CELLS #: 0 10^3/UL (ref 0–0)
NUCLEATED RED BLOOD CELLS%: 0 /100WBC (ref 0–0)
PARTIAL THROMBOPLASTIN TIME: 32 SEC (ref 23–35)
PLATELET COUNT: 290 10^3/UL (ref 140–415)
PROTIME: 12.5 SEC (ref 11.9–14.9)
PT RATIO: 1
RAPID PLASMA REAGIN: NONREACTIVE
RED BLOOD COUNT: 3.8 10^6/UL (ref 4.2–5.4)
RED CELL DISTRIBUTION WIDTH: 14.5 % (ref 11.5–14.5)
WHITE BLOOD COUNT: 9.4 10^3/UL (ref 4.8–10.8)

## 2019-07-11 PROCEDURE — G0463 HOSPITAL OUTPT CLINIC VISIT: HCPCS

## 2019-07-11 PROCEDURE — 85730 THROMBOPLASTIN TIME PARTIAL: CPT

## 2019-07-11 PROCEDURE — 85025 COMPLETE CBC W/AUTO DIFF WBC: CPT

## 2019-07-11 PROCEDURE — 0UB70ZZ EXCISION OF BILATERAL FALLOPIAN TUBES, OPEN APPROACH: ICD-10-PCS

## 2019-07-11 PROCEDURE — 88302 TISSUE EXAM BY PATHOLOGIST: CPT

## 2019-07-11 PROCEDURE — 76818 FETAL BIOPHYS PROFILE W/NST: CPT

## 2019-07-11 PROCEDURE — 86850 RBC ANTIBODY SCREEN: CPT

## 2019-07-11 PROCEDURE — 87340 HEPATITIS B SURFACE AG IA: CPT

## 2019-07-11 PROCEDURE — 86592 SYPHILIS TEST NON-TREP QUAL: CPT

## 2019-07-11 PROCEDURE — 85610 PROTHROMBIN TIME: CPT

## 2019-07-11 PROCEDURE — 86900 BLOOD TYPING SEROLOGIC ABO: CPT

## 2019-07-11 PROCEDURE — 0UB70ZZ EXCISION OF BILATERAL FALLOPIAN TUBES, OPEN APPROACH: ICD-10-PCS | Performed by: OBSTETRICS & GYNECOLOGY

## 2019-07-11 PROCEDURE — 86901 BLOOD TYPING SEROLOGIC RH(D): CPT

## 2019-07-11 RX ADMIN — ONDANSETRON HYDROCHLORIDE 1 MG: 2 INJECTION, SOLUTION INTRAMUSCULAR; INTRAVENOUS at 18:31

## 2019-07-11 RX ADMIN — KETOROLAC TROMETHAMINE PRN MG: 30 INJECTION, SOLUTION INTRAMUSCULAR at 20:38

## 2019-07-11 RX ADMIN — KETOROLAC TROMETHAMINE 1 MG: 30 INJECTION, SOLUTION INTRAMUSCULAR at 13:50

## 2019-07-11 RX ADMIN — KETOROLAC TROMETHAMINE PRN MG: 30 INJECTION, SOLUTION INTRAMUSCULAR at 13:50

## 2019-07-11 RX ADMIN — PYRIDOXINE HYDROCHLORIDE 1 MLS/HR: 100 INJECTION, SOLUTION INTRAMUSCULAR; INTRAVENOUS at 08:31

## 2019-07-11 RX ADMIN — PYRIDOXINE HYDROCHLORIDE 1 MLS/HR: 100 INJECTION, SOLUTION INTRAMUSCULAR; INTRAVENOUS at 10:31

## 2019-07-11 RX ADMIN — KETOROLAC TROMETHAMINE 1 MG: 30 INJECTION, SOLUTION INTRAMUSCULAR at 20:38

## 2019-07-11 RX ADMIN — HYDROMORPHONE HYDROCHLORIDE 1 MG: 1 INJECTION, SOLUTION INTRAMUSCULAR; INTRAVENOUS; SUBCUTANEOUS at 15:25

## 2019-07-11 RX ADMIN — CEFAZOLIN SODIUM 1 MLS/HR: 2 SOLUTION INTRAVENOUS at 11:53

## 2019-07-11 RX ADMIN — PYRIDOXINE HYDROCHLORIDE 1 MLS/HR: 100 INJECTION, SOLUTION INTRAMUSCULAR; INTRAVENOUS at 10:54

## 2019-07-11 RX ADMIN — CEFAZOLIN SODIUM 1 MLS/HR: 2 SOLUTION INTRAVENOUS at 18:08

## 2019-07-11 RX ADMIN — Medication 1 MLS/HR: at 14:50

## 2019-07-11 RX ADMIN — CEFAZOLIN SODIUM 1 MLS/HR: 2 SOLUTION INTRAVENOUS at 14:02

## 2019-07-11 NOTE — OPR
DATE OF OPERATION:  2019

 

 

PREOPERATIVE DIAGNOSES:  Intrauterine pregnancy at 37 weeks gestational age in labor, previous C-sect
ion x1, desires elective repeat  delivery with bilateral tubal sterilization, declines vagina
l birth after .

 

POSTOPERATIVE DIAGNOSES:  Intrauterine pregnancy at 37 weeks gestational age in labor, previous C-sec
tion x1, desires elective repeat  delivery with bilateral tubal sterilization, declines vagin
al birth after .

 

OPERATION PERFORMED:  Repeat low transverse  delivery with bilateral tubal ligation, [place] 
method.

 

SURGEON:  Vashti Orantes MD

 

ASSISTANT:  Dr. Gray.

 

ANESTHESIA:  Spinal.

 

ANESTHESIOLOGIST:  Dr. Keon Ayon.

 

COMPLICATIONS:  None.

 

ESTIMATED BLOOD LOSS:  500 mL.

 

FINDINGS:  A viable male, Apgar 8 and 9 respectively at 1 and 5 minutes, weight 6 pounds 5 ounces.  N
ormal uterus, tubes and ovaries.

 

DESCRIPTION OF PROCEDURE:  After explaining the risks, benefits and alternatives, the patient and con
sent signed in chart, the patient was taken to the operating room where spinal anesthesia was found t
o be adequate.  She was then prepared and draped in normal sterile fashion in dorsal supine position 
with a leftward tilt.  A Pfannenstiel skin incision was then made with a scalpel and [facility].  The fascia was incised in the midline and incision was extended laterally with May
o scissors.  The superior aspect of the fascial incision was grasped with curved clamps, elevated and
 the underlying rectus muscles dissected off bluntly.  Attention was then turned to the inferior aspe
ct of incision, which in similar fashion was grasped, tented up with curved clamps and rectus muscles
 dissected off bluntly.  The rectus muscle was  in midline, peritoneum identified, tented up
 and entered sharply with Metzenbaum scissors.  The peritoneal incision was extended superiorly with 
good visualization of bladder.  The bladder blade was inserted and the lower segment incised in trans
verse fashion with the scalpel.  The bladder blade was removed and the infant's head delivered atraum
atically.  The nose and mouth were suctioned and cord clamped and cut.  The infant was handed off to 
waiting pediatrician.  The placenta was then removed.  The uterus was exteriorized and cleared of all
 clots and debris.  The uterine incision was repaired with 1-0 chromic in a running locked fashion.  
A second layer of same suture was used for imbrication obtaining excellent hemostasis.  At this point
 a [place] was used to grasp the right fallopian tube 3 cm from the cornual region and then ligated w
ith a free tie of plain gut and excised with good hemostasis noted.  Similarly, the left fallopian tu
be was excised and good hemostasis was assured and noted.  At this point, the uterus was returned to 
the abdomen.  The gutters were cleared of all clots.  The peritoneum and rectus abdominis muscles wer
e reapproximated with 2-0 Monocryl in an interrupted fashion.  The fascia was reapproximated with 0 V
icryl in a running fashion.  The subcutaneous tissue was reapproximated with 2-0 plain gut in a runni
ng fashion.  The skin was closed with absorbable staples.  The patient tolerated the procedure well. 
 All counts were correct.  The patient was taken to recovery room in stable condition.

 

 

Dictated By: VASHTI MORENO/KARLO

DD:    2019 12:50:10

DT:    2019 15:10:11

Conf#: [id number]

DID#:  [id number]

## 2019-07-11 NOTE — PREOPHP
DATE OF ADMISSION: 2019

 

HISTORY OF PRESENT ILLNESS:  The patient is a 28-year-old  3, para 2, EDC 2019 intrauter
ine pregnancy at 37 weeks gestational age, presented to triage complaining of regular contractions si
nce early this morning.  She reports of pain scale 6/10.  She has a significant history of 1 previous
 , and desires elective repeat  delivery with a tubal sterilization.  She denies any
 vaginal bleeding or discharge.  Her prenatal care took place at [facility].

 

PAST MEDICAL HISTORY:  None.

 

MEDICATIONS:  Prenatal vitamins.

 

PAST SURGICAL HISTORY:  x1 previous  section.

 

OBSTETRICAL HISTORY:  x1 previous  section, x1 vaginal delivery.

 

GYNECOLOGIC HISTORY:  10, regular 3 to 4 days.  Denies any sexually transmitted infections.  Sexually
 active with 1 partner.

 

SOCIAL HISTORY:  Denies any smoking, drugs or alcohol.

 

FAMILY HISTORY:  None.

 

REVIEW OF SYSTEMS:  All within normal except history of present illness.

 

PHYSICAL EXAMINATION:

HEENT:  Within normal.

LUNGS:  CTA bilateral.

CARDIOVASCULAR:  S1, S2, regular rhythm.

ABDOMEN:  Gravid, nontender.  Negative CVA bilateral.

EXTREMITIES:  Negative edema.  No calf tenderness.

PELVIC:  Vaginal exam 250, -2.  Fetal heart tracing category 1.  Stephen regular contractions.

 

ASSESSMENT:  Intrauterine pregnancy at 37 weeks gestational age, in labor.  Previous  x1, de
sires elective repeat  delivery with bilateral tubal sterilization.

 

PLAN:  Consent for repeat  delivery with bilateral tubal sterilization.  Risks, benefits and 
alternatives explained.  All questions were answered.

 

 

Dictated By: VASHTI MORENO/KARLO

DD:    2019 11:26:04

DT:    2019 12:35:58

Conf#: [id number]

DID#:  [id number]

## 2019-07-11 NOTE — PREAC
Date/Time of Note


Date/Time of Note


DATE: 7/11/19 


TIME: 11:03





Anesthesia Eval and Record


Evaluation


Time Pre-Procedure Interview


DATE: 7/11/19 


TIME: 11:03


Age


28


Sex


female


NPO:  8 hrs


Preoperative diagnosis


previous c section


Planned procedure


repeat c section





Past Medical History


Past Medical History:  None





Surgery & Anesthesia Issues


No known issue





Meds


Anticoagulation:  No


Beta Blocker within 24 hr:  No


Reason Beta Blocker not given:  Pt. not on B-Blocker


Reported Medications


Ferrous Sulfate (Iron) 1 Tab Tablet


   8/12/10


Pv W-O Vit A/Fe Fumarate/Fa (Precare Prenatal Caplet) 1 Tab Tablet


   8/12/10





Current Medications


Lactated Ringer's 1,000 ml @  125 mls/hr Q8H IV  Last administered on 7/11/19at 


10:31; Admin Dose 125 MLS/HR;  Start 7/11/19 at 08:00


Lactated Ringer's 1,000 ml @  125 mls/hr Q8H IV  Last administered on 7/11/19at 


10:54; Admin Dose 125 MLS/HR;  Start 7/11/19 at 09:46


Cefazolin Sodium/ Dextrose 50 ml @  100 mls/hr ONCE IVPB ;  Start 7/11/19 at 


10:00


Oxytocin/Lactated Ringer's 500 ml @  125 mls/hr POST PARTUM IV ;  Start 7/11/19 


at 10:00


Oxytocin/Lactated Ringer's 500 ml @ 0 mls/hr ONCE PRN IV .VAGINAL BLEEDING;  


Start 7/11/19 at 10:00


Methylergonovine Maleate (Methergine) 0.2 mg ONCE PRN IM .VAGINAL BLEEDING;  


Start 7/11/19 at 10:00


Carboprost Tromethamine (Hemabate) 250 mcg ONCE PRN IM .VAGINAL BLEEDING;  Start


7/11/19 at 10:00


Misoprostol (Cytotec) 1,000 mcg ONCE PRN MN .VAGINAL BLEEDING;  Start 7/11/19 at


10:00


Meds reviewed:  Yes





Allergies


Coded Allergies:  


     No Known Allergy (Unverified , 1/14/17)


Allergies Reviewed:  Yes





Labs/Studies


Labs Reviewed:  Reviewed by anesthesiologist


Result Diagram:  


7/11/19 0850





Laboratory Tests


7/11/19 08:50











Blood Bank


                  Test
                         7/11/19
08:50


                  Antibody Screen               NEGATIVE


                  Blood Type                    O POSITIVE


                  Rh Immune Globulin Candidate  NO





Pregnancy test:  N/A





Pre-procedure Exam


Last vitals





Vital Signs


  Date      Temp  Pulse  Resp  B/P (MAP)   Pulse Ox  O2          O2 Flow    FiO2


Time                                                 Delivery    Rate


   7/11/19  98.4     75    19      125/71            Room Air


     07:52                           (89)





Airway:  Adequate mouth opening, Adequate thyromental dist


Mallampati:  Mallampati IV


Teeth:  Normal


Lung:  Normal


Heart:  Normal





ASA Physical Status


ASA physical status:  2


Emergency:  None





Pre-operative Attestations


Prior to commencing anesthesia and surgery, the patient was re-evaluated, there 


was verification of:


*The patient's identity


*The results of appropriate recent lab work and preoperative vital signs


*The above evaluation not changing prior to induction


*Anesthetic plan, risk benefits, alternative and complications discussed with 


patient/family; questions answered; patient/family understands, accepts and 


wishes to proceed.











JAROD IVERSON DO             Jul 11, 2019 11:03

## 2019-07-11 NOTE — PAC
Date/Time of Note


Date/Time of Note


DATE: 7/11/19 


TIME: 12:30





Post-Anesthesia Notes


Post-Anesthesia Note


Last documented vital signs





Vital Signs


  Date      Temp  Pulse  Resp  B/P (MAP)  Pulse Ox  O2          O2 Flow     FiO2


Time                                                Delivery    Rate


   7/11/19    98     70    19      95/59        97  Room Air


      1235





Activity:  WNL


Respiratory function:  WNL


Cardiovascular function:  WNL


Mental status:  Baseline


Pain reasonably controlled:  Yes


Hydration appropriate:  Yes


Nausea/Vomiting absent:  Yes











JAROD IVERSON DO             Jul 11, 2019 12:32

## 2019-07-11 NOTE — OPPN
Date/Time of Note


Date/Time of Note


DATE: 19 


TIME: 12:40





Operative Report


Planned Procedure


Procedure date


2019


Procedure(s)


repeat low transverse CD with bilateral tubal ligation (pool method)


Performed by


see signature line


Assistant:  MARISABEL NORTON MD


2nd Assistant


none


Anesthesiologist:  JAROD IVERSON DO


Pre-procedure diagnosis


 Intrauterine pregnancy at 37 weeks gestational age, in labor.  Previous C-s


ection x1, desires elective repeat  delivery with bilateral tubal 


sterilization., decline 


                 [id number]
Anesthesia Type:  Bndpm2r
spinal








Post-Procedure


Post-procedure diagnosis


same


Findings


a viable male apgar 8/9, weight 6lb 50z normal uterus tubes and ovaries


Estimated Blood Loss:  500 - 600 mls (500)


Specimen(s)


portions of right and left fallopian tube


Grafts/Implant(s)


none


Complication(s)


none











VASHTI DELUNA MD           2019 12:42

## 2019-07-11 NOTE — TRIAGE
===================================

OB Triage

===================================

Datetime Report Generated by CPN: 07/11/2019 10:15

   

   

===========================

Datetime: 07/11/2019 09:30

===========================

   

   

===================================

Labor Evaluation

===================================

   

 Frequency:  irregular

 Monitor Mode:  External

 Duration (sec)2399:  50-90

 Quality:  Mild

 Pattern:  Normal: <= 5 Contractions in 10 Minutes

 Resting Tone [place]:  Relaxed

   

===================================

Fetal Heart Rate

===================================

   

 FHR Baseline Rate:  135

 Monitor Mode:  External US

 Variability:  Moderate 6-25 bpm

 Accelerations:  15X15

 Decelerations:  None

 Category:  Category I

   

===========================

Datetime: 07/11/2019 08:01

===========================

   

   

===================================

Vaginal Exam

===================================

   

 Dilatation (cms):  0.0

 Effacement (%):  0

 Station:  -3

 Exam By:  VANDA BASHIR

   

===========================

Datetime: 07/11/2019 07:57

===========================

   

 Assessment Type:  Triage

   

===================================

Maternal Assessment

===================================

   

 Level of Consciousness:  Keenly Alert, Responsive

 DTR's/Clonus:  DTRs 2+; No Clonus

 Headache:  Denies

 Blurred Vision:  No

 Respiratory Effort:  Unlabored; Regular Rhythm; Equal Expansion

 Breath Sounds, Left:  Clear and Equal

 Breath Sounds, Right:  Clear and Equal

 Nausea/Vomiting:  Denies

 RUQ Epigastric Pain:  Denies

 Lower Extremities Edema:  None

     Degree:  None

 Upper Extremities Edema:  None

     Degree:  None

 Facial Edema:  None

   

===================================

Fall Risk Assessment

===================================

   

 History of Falling:  (0) No

 Secondary Diagnosis:  (0) No

 Ambulatory Aid:  (0) Bedrest/Nurse Assist

 IV Therapy:  (0) No

 Gait:  (0) Normal/Bedrest/Immobile

 Mental Status:  (0) Oriented to Own Ability

 Fall Score:  0

 Fall Risk Score Definition:  No Risk: No action required

   

===================================

Labor Evaluation

===================================

   

 Frequency:  IRREG

 Monitor Mode:  External

 Duration (sec)2399:  

 Quality:  Mild

 Pattern:  Normal: <= 5 Contractions in 10 Minutes

 Resting Tone [place]:  Relaxed

   

===================================

Fetal Heart Rate

===================================

   

 FHR Baseline Rate:  145

 Monitor Mode:  External US

 Variability:  Moderate 6-25 bpm

 Accelerations:  15X15

 Decelerations:  None

 Category:  Category I

   

===========================

Datetime: 07/11/2019 07:56

===========================

   

 Time of Arrival:  07/11/2019 07:21

 EGA:  37.4

 Arrived By:  Ambulatory

 Arrived From:  Home

 Chief Complaint:  UC'S SINCE 1930 7/10/19

 Fetal Movement:  Present

 Contractions:  Irregular

 Time Contractions Began:  07/10/2019 19:30

 Rupture of Membranes:  Denies

 Vaginal Bleeding:  None

 Vaginal Discharge:  Denies

 Recent Sexual Intercouse:  Denies

 Abdominal Trauma:  Not Applicable

 Time Provider Notified:  07/11/2019 08:04

 Provider Notified:  dr junior

 Initial Plan:  NST

## 2019-07-12 VITALS — RESPIRATION RATE: 18 BRPM | DIASTOLIC BLOOD PRESSURE: 55 MMHG | HEART RATE: 73 BPM | SYSTOLIC BLOOD PRESSURE: 111 MMHG

## 2019-07-12 VITALS — SYSTOLIC BLOOD PRESSURE: 114 MMHG | DIASTOLIC BLOOD PRESSURE: 59 MMHG | HEART RATE: 70 BPM | RESPIRATION RATE: 17 BRPM

## 2019-07-12 VITALS — HEART RATE: 83 BPM | DIASTOLIC BLOOD PRESSURE: 62 MMHG | SYSTOLIC BLOOD PRESSURE: 109 MMHG | RESPIRATION RATE: 18 BRPM

## 2019-07-12 VITALS — RESPIRATION RATE: 18 BRPM | DIASTOLIC BLOOD PRESSURE: 55 MMHG | SYSTOLIC BLOOD PRESSURE: 104 MMHG | HEART RATE: 76 BPM

## 2019-07-12 LAB
ADD MAN DIFF?: NO
BASOPHIL #: 0 10^3/UL (ref 0–0.1)
BASOPHILS %: 0.4 % (ref 0–2)
EOSINOPHILS #: 0.1 10^3/UL (ref 0–0.5)
EOSINOPHILS %: 0.8 % (ref 0–7)
HEMATOCRIT: 22.6 % (ref 37–47)
HEMOGLOBIN: 7.2 G/DL (ref 12–16)
IMMATURE GRANS #M: 0.05 10^3/UL (ref 0–0.03)
IMMATURE GRANS % (M): 0.4 % (ref 0–0.43)
LYMPHOCYTES #: 3.1 10^3/UL (ref 0.8–2.9)
LYMPHOCYTES %: 27.5 % (ref 15–51)
MEAN CORPUSCULAR HEMOGLOBIN: 24.3 PG (ref 29–33)
MEAN CORPUSCULAR HGB CONC: 31.9 G/DL (ref 32–37)
MEAN CORPUSCULAR VOLUME: 76.4 FL (ref 82–101)
MEAN PLATELET VOLUME: 10.4 FL (ref 7.4–10.4)
MONOCYTE #: 0.7 10^3/UL (ref 0.3–0.9)
MONOCYTES %: 6.4 % (ref 0–11)
NEUTROPHIL #: 7.3 10^3/UL (ref 1.6–7.5)
NEUTROPHILS %: 64.5 % (ref 39–77)
NUCLEATED RED BLOOD CELLS #: 0 10^3/UL (ref 0–0)
NUCLEATED RED BLOOD CELLS%: 0 /100WBC (ref 0–0)
PLATELET COUNT: 275 10^3/UL (ref 140–415)
RED BLOOD COUNT: 2.96 10^6/UL (ref 4.2–5.4)
RED CELL DISTRIBUTION WIDTH: 14.6 % (ref 11.5–14.5)
WHITE BLOOD COUNT: 11.2 10^3/UL (ref 4.8–10.8)

## 2019-07-12 RX ADMIN — DOCUSATE SODIUM SCH MG: 100 CAPSULE, LIQUID FILLED ORAL at 21:02

## 2019-07-12 RX ADMIN — DOCUSATE SODIUM 1 MG: 100 CAPSULE, LIQUID FILLED ORAL at 21:02

## 2019-07-12 RX ADMIN — IBUPROFEN 1 MG: 600 TABLET ORAL at 17:47

## 2019-07-12 RX ADMIN — CEFAZOLIN SODIUM 1 MLS/HR: 2 SOLUTION INTRAVENOUS at 09:51

## 2019-07-12 RX ADMIN — IBUPROFEN SCH MG: 600 TABLET ORAL at 12:00

## 2019-07-12 RX ADMIN — OXYCODONE HYDROCHLORIDE AND ACETAMINOPHEN 1 TAB: 5; 325 TABLET ORAL at 19:16

## 2019-07-12 RX ADMIN — KETOROLAC TROMETHAMINE 1 MG: 30 INJECTION, SOLUTION INTRAMUSCULAR at 02:35

## 2019-07-12 RX ADMIN — CEFAZOLIN SODIUM 1 MLS/HR: 2 SOLUTION INTRAVENOUS at 02:15

## 2019-07-12 RX ADMIN — PYRIDOXINE HYDROCHLORIDE 1 MLS/HR: 100 INJECTION, SOLUTION INTRAMUSCULAR; INTRAVENOUS at 00:45

## 2019-07-12 RX ADMIN — KETOROLAC TROMETHAMINE PRN MG: 30 INJECTION, SOLUTION INTRAMUSCULAR at 09:50

## 2019-07-12 RX ADMIN — MAGNESIUM HYDROXIDE 1 ML: 400 SUSPENSION ORAL at 16:23

## 2019-07-12 RX ADMIN — KETOROLAC TROMETHAMINE 1 MG: 30 INJECTION, SOLUTION INTRAMUSCULAR at 09:50

## 2019-07-12 RX ADMIN — KETOROLAC TROMETHAMINE PRN MG: 30 INJECTION, SOLUTION INTRAMUSCULAR at 02:35

## 2019-07-12 RX ADMIN — IBUPROFEN 1 MG: 600 TABLET ORAL at 12:00

## 2019-07-12 RX ADMIN — IBUPROFEN SCH MG: 600 TABLET ORAL at 17:47

## 2019-07-12 NOTE — QN
Documentation


Comment


POD#1 is stable afebrile +Flatus No VB +voids


VS stable


Gen NAD


Abd soft NT ND Dressing to be removed


Genitalia No blood at perineum


-->Ambulation











NIKHIL AUGUSTINE M.D.            Jul 12, 2019 12:10

## 2019-07-13 VITALS — HEART RATE: 70 BPM | SYSTOLIC BLOOD PRESSURE: 98 MMHG | RESPIRATION RATE: 18 BRPM | DIASTOLIC BLOOD PRESSURE: 50 MMHG

## 2019-07-13 VITALS — SYSTOLIC BLOOD PRESSURE: 119 MMHG | DIASTOLIC BLOOD PRESSURE: 55 MMHG | HEART RATE: 70 BPM | RESPIRATION RATE: 18 BRPM

## 2019-07-13 VITALS — RESPIRATION RATE: 18 BRPM | SYSTOLIC BLOOD PRESSURE: 104 MMHG | HEART RATE: 77 BPM | DIASTOLIC BLOOD PRESSURE: 51 MMHG

## 2019-07-13 VITALS — RESPIRATION RATE: 18 BRPM | HEART RATE: 74 BPM | SYSTOLIC BLOOD PRESSURE: 119 MMHG | DIASTOLIC BLOOD PRESSURE: 61 MMHG

## 2019-07-13 RX ADMIN — IBUPROFEN 1 MG: 600 TABLET ORAL at 11:46

## 2019-07-13 RX ADMIN — IBUPROFEN 1 MG: 600 TABLET ORAL at 01:06

## 2019-07-13 RX ADMIN — IBUPROFEN 1 MG: 600 TABLET ORAL at 23:52

## 2019-07-13 RX ADMIN — IBUPROFEN 1 MG: 600 TABLET ORAL at 18:00

## 2019-07-13 RX ADMIN — IBUPROFEN SCH MG: 600 TABLET ORAL at 23:52

## 2019-07-13 RX ADMIN — IBUPROFEN SCH MG: 600 TABLET ORAL at 01:06

## 2019-07-13 RX ADMIN — OXYCODONE HYDROCHLORIDE AND ACETAMINOPHEN 1 TAB: 5; 325 TABLET ORAL at 01:06

## 2019-07-13 RX ADMIN — DOCUSATE SODIUM 1 MG: 100 CAPSULE, LIQUID FILLED ORAL at 21:21

## 2019-07-13 RX ADMIN — DOCUSATE SODIUM SCH MG: 100 CAPSULE, LIQUID FILLED ORAL at 11:46

## 2019-07-13 RX ADMIN — DOCUSATE SODIUM SCH MG: 100 CAPSULE, LIQUID FILLED ORAL at 21:21

## 2019-07-13 RX ADMIN — OXYCODONE HYDROCHLORIDE AND ACETAMINOPHEN 1 TAB: 5; 325 TABLET ORAL at 13:31

## 2019-07-13 RX ADMIN — IBUPROFEN 1 MG: 600 TABLET ORAL at 05:32

## 2019-07-13 RX ADMIN — IBUPROFEN SCH MG: 600 TABLET ORAL at 05:32

## 2019-07-13 RX ADMIN — DOCUSATE SODIUM 1 MG: 100 CAPSULE, LIQUID FILLED ORAL at 11:46

## 2019-07-13 RX ADMIN — Medication 1 APPLIC: at 13:27

## 2019-07-13 RX ADMIN — IBUPROFEN SCH MG: 600 TABLET ORAL at 11:46

## 2019-07-13 RX ADMIN — IBUPROFEN SCH MG: 600 TABLET ORAL at 18:00

## 2019-07-13 RX ADMIN — OXYCODONE HYDROCHLORIDE AND ACETAMINOPHEN 1 TAB: 5; 325 TABLET ORAL at 19:43

## 2019-07-13 NOTE — QN
Documentation


Comment


POD #2 s/p repeat .


Pt reports less than adequate pain relief and was last given Motrin and received


one Percocet a 0100 this AM. She is requesting 2 tabs now. +Flatus. No N/V.


Fundus firm


Incision is clean, dry and intact with old blood present only.


Lochia minimal.


Ext NT, no edema.


T= 98.1  /51


P: Continue care and plan d/c tomorrow.


Wrote an order for Percocet 2 tabs q 4 hours prn to accompany the one tab for a 


lower level of pain. Instructed the nurse that she would like 2 tabs now.


Expect d/c home tomorrow.











RAMA LAND MD             2019 12:42

## 2019-07-14 VITALS — RESPIRATION RATE: 18 BRPM | SYSTOLIC BLOOD PRESSURE: 115 MMHG | HEART RATE: 75 BPM | DIASTOLIC BLOOD PRESSURE: 58 MMHG

## 2019-07-14 VITALS — DIASTOLIC BLOOD PRESSURE: 58 MMHG | RESPIRATION RATE: 16 BRPM | SYSTOLIC BLOOD PRESSURE: 106 MMHG | HEART RATE: 70 BPM

## 2019-07-14 RX ADMIN — IBUPROFEN 1 MG: 600 TABLET ORAL at 12:31

## 2019-07-14 RX ADMIN — IBUPROFEN SCH MG: 600 TABLET ORAL at 05:42

## 2019-07-14 RX ADMIN — DOCUSATE SODIUM 1 MG: 100 CAPSULE, LIQUID FILLED ORAL at 09:29

## 2019-07-14 RX ADMIN — IBUPROFEN SCH MG: 600 TABLET ORAL at 12:31

## 2019-07-14 RX ADMIN — DOCUSATE SODIUM SCH MG: 100 CAPSULE, LIQUID FILLED ORAL at 09:29

## 2019-07-14 RX ADMIN — IBUPROFEN 1 MG: 600 TABLET ORAL at 05:42

## 2019-07-14 RX ADMIN — CLOSTRIDIUM TETANI TOXOID ANTIGEN (FORMALDEHYDE INACTIVATED), CORYNEBACTERIUM DIPHTHERIAE TOXOID ANTIGEN (FORMALDEHYDE INACTIVATED), BORDETELLA PERTUSSIS TOXOID ANTIGEN (GLUTARALDEHYDE INACTIVATED), BORDETELLA PERTUSSIS FILAMENTOUS HEMAGGLUTININ ANTIGEN (FORMALDEHYDE INACTIVATED), BORDETELLA PERTUSSIS PERTACTIN ANTIGEN, AND BORDETELLA PERTUSSIS FIMBRIAE 2/3 ANTIGEN 1 ML: 5; 2; 2.5; 5; 3; 5 INJECTION, SUSPENSION INTRAMUSCULAR at 12:48

## 2019-07-14 NOTE — QN
Documentation


Comment


POD#3 is stable afebrile tolerates diet No VB +BM +voids


VS stable 


Gen NAD


Abd soft NT ND Incision intact


Genitalia No blood at perineum


--->Discharge plan











NIKHIL AUGUSTINE M.D.            Jul 14, 2019 11:19

## 2019-07-14 NOTE — DS
Date/Time of Note


Date/Time of Note


DATE: 7/14/19 


TIME: 11:19





Discharge Summary


Admission/Discharge Info


Admit Date/Time


Jul 11, 2019 at 09:50


Discharge Date/Time


7/14/2019


Discharge Diagnosis


postpartum


Patient Condition:  Good


Hospital Course


uneventful


Home Meds


Reported Medications


Ferrous Sulfate (Iron) 1 Tab Tablet


   8/12/10


Pv W-O Vit A/Fe Fumarate/Fa (Precare Prenatal Caplet) 1 Tab Tablet


   8/12/10


Primary Care Provider


Care Physician No Primary











NIKHIL AUGUSTINE M.D.            Jul 14, 2019 11:20

## 2019-07-15 NOTE — DELSUM
===================================

Delivery Summary A-C

===================================

Datetime Report Generated by CPN: 07/15/2019 15:50

   

   

===================================

DELIVERY PERSONNEL

===================================

   

Circulator:  Cubil, Michell

   

===================================

MATERNAL INFORMATION

===================================

   

Delivery Anesthesia:  Spinal

Medications in Delivery:  see anesthesia

Delivery QBL (ml):  500

Placenta Cultured:  No

Maternal Complications:  Other

Other Maternal Complications:  PREVOUS  SECTION, HAD I , BUT REFUSING TO HAVE VAGINAL DEL
KEVIN THIS TIME

   

===================================

LABOR SUMMARY

===================================

   

EDC:  2019 00:00

No. Babies in Womb:  1

 Attempted:  1

Labor Anesthesia:  None

   

===================================

LABOR INFORMATION

===================================

   

Reason for Induction:  Not Applicable

Onset of Labor:  07/10/2019 19:30

Group B Beta Strep:  Done, Result Unknown

Antibiotics # of Doses:  1

Antibiotics Time of Last Dose:  2019 11:53

Steroids Given:  None

Reason Steroids Not Administered:  Not Applicable

   

===================================

MEMBRANES

===================================

   

Membranes Rupture Method:  Artificial

Rupture of Membranes:  2019 12:02

Length of Rupture (hr):  0.00

Amniotic Fluid Color:  Clear

Amniotic Fluid Amount:  Moderate

Amniotic Fluid Odor:  Normal

   

===================================

STAGES OF LABOR

===================================

   

Stage 3 hr:  0

Stage 3 min:  1

Total Time in Labor hr:  16

Total Time in Labor min:  33

   

===================================

CSECTION DELIVERY

===================================

   

Primary Indication:  Repeat Elective

Secondary Indication:  Other

Other Secondary Indication:  bilateral tubal ligation

CSection Urgency:  Non Elective

CSection Incidence:  Repeat

Labor:  Labor

Elective:  Nonelective

CSection Incision:  Lower Uterine Transverse

Sterilization Procedure:  [place]

   

===================================

BABY A INFORMATION

===================================

   

Infant Delivery Date/Time:  2019 12:02

Method of Delivery:  

Born in Route :  No

:  N/A

Forceps:  N/A

Vacuum Extraction:  N/A

Shoulder Dystocia :  N/A

   

===================================

SHOULDER DYSTOCIA BABY A

===================================

   

Infant Delivery Date/Time:  2019 12:02

   

===================================

PRESENTATION/POSITION BABY A

===================================

   

Presentation:  Cephalic

Cephalic Presentation:  Vertex

Vertex Position:  Right Occipital Posterior

Breech Presentation:  N/A

   

===================================

PLACENTA INFORMATION BABY A

===================================

   

Placenta Delivery Time :  2019 12:03

Placenta Method of Delivery:  Manual Removal

Placenta Status:  Delivered

   

===================================

APGAR SCORES BABY A

===================================

   

Heart Rate 1 min:  >100 bpm

Resp Effort 1 min:  Good Cry

Reflex Irritability 1 min:  Cough/Sneeze/Pulls Away

Muscle Tone 1 min:  Active Motion

Color 1 min:  Blue/Pale

Resuscitation Effort 1 min:  Tactile Stimulation

APGAR SCORE 1 MIN:  8

Heart Rate 5 min:  >100 bpm

Resp Effort 5 min:  Good Cry

Reflex Irritability 5 min:  Cough/Sneeze/Pulls Away

Muscle Tone 5 min:  Active Motion

Color 5 min:  Body Pink, Extremit Blue

Resuscitation Effort 5 min:  Tactile Stimulation

APGAR SCORE 5 MIN:  9

   

===================================

INFANT INFORMATION BABY A

===================================

   

Gestational Age at Delivery:  37.4

Gestational Status:  Early Term- 37- 38.6 Weeks

Infant Outcome :  Liveborn, with signs of life

Infant Condition :  Stable

Infant Sex:  Male

   

===================================

IDENTIFICATION/MEDS BABY A

===================================

   

ID Band Number:  [id number]

ID Band Location:  Right Leg; Right Arm



Sensor Applied:  Yes

Sensor Number:  [id number]

Sensor Location :  Cord Clamp

Vitamin K Given :  Not Given

Erythromycin Given:  Not Given

   

===================================

WEIGHT/LENGTH BABY A

===================================

   

Infant Birthweight (gm):  2870

Infant Weight (lb):  6

Infant Weight (oz):  5

Infant Length (in):  19.00

Infant Length (cm):  48.26

   

===================================

CORD INFORMATION BABY A

===================================

   

No. Cord Vessels:  3

Nuchal Cord :  N/A

Cord Blood Taken:  Yes

Infant Suction:  Mouth; Nose

   

===================================

ASSESSMENT BABY A

===================================

   

Infant Complications:  None

Physical Findings at Delivery:  Within Normal Limits

Physical Findings- Other:  hypospadia

Infant Respirations:  Appears Normal

Neonatologist/ALS Called :  No

Infant Care By:  [place] rn

Transferred To:  Remains with Mother

## 2022-09-09 ENCOUNTER — HOSPITAL ENCOUNTER (EMERGENCY)
Dept: HOSPITAL 15 - ER | Age: 32
LOS: 1 days | Discharge: HOME | End: 2022-09-10
Payer: MEDICAID

## 2022-09-09 VITALS — BODY MASS INDEX: 28.4 KG/M2 | WEIGHT: 154.32 LBS | HEIGHT: 62 IN
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
## 2022-09-09 DIAGNOSIS — R51.9: ICD-10-CM

## 2022-09-09 DIAGNOSIS — M25.561: ICD-10-CM

## 2022-09-09 DIAGNOSIS — M25.562: ICD-10-CM

## 2022-09-09 DIAGNOSIS — M79.10: Primary | ICD-10-CM

## 2022-09-09 DIAGNOSIS — M54.50: ICD-10-CM

## 2022-09-09 PROCEDURE — 70450 CT HEAD/BRAIN W/O DYE: CPT

## 2022-09-09 PROCEDURE — 72100 X-RAY EXAM L-S SPINE 2/3 VWS: CPT

## 2022-09-09 PROCEDURE — 99284 EMERGENCY DEPT VISIT MOD MDM: CPT

## 2022-09-09 PROCEDURE — 96372 THER/PROPH/DIAG INJ SC/IM: CPT

## 2022-09-10 VITALS — SYSTOLIC BLOOD PRESSURE: 116 MMHG | DIASTOLIC BLOOD PRESSURE: 71 MMHG
